# Patient Record
Sex: MALE | Race: BLACK OR AFRICAN AMERICAN | NOT HISPANIC OR LATINO | Employment: OTHER | URBAN - METROPOLITAN AREA
[De-identification: names, ages, dates, MRNs, and addresses within clinical notes are randomized per-mention and may not be internally consistent; named-entity substitution may affect disease eponyms.]

---

## 2023-03-24 LAB — PROSTATE SPECIFIC AG (NG/ML) IN SER/PLAS: 0.28 NG/ML (ref 0–4)

## 2023-03-26 LAB — URINE CULTURE: NO GROWTH

## 2023-03-29 LAB
APPEARANCE, URINE: ABNORMAL
BILIRUBIN, URINE: NEGATIVE
BLOOD, URINE: NEGATIVE
COLOR, URINE: ABNORMAL
GLUCOSE, URINE: NEGATIVE MG/DL
KETONES, URINE: NEGATIVE MG/DL
LEUKOCYTE ESTERASE, URINE: NEGATIVE
NITRITE, URINE: NEGATIVE
PH, URINE: 5 (ref 5–8)
PROTEIN, URINE: NEGATIVE MG/DL
SPECIFIC GRAVITY, URINE: 1.03 (ref 1–1.03)
UROBILINOGEN, URINE: 2 MG/DL (ref 0–1.9)

## 2023-05-08 DIAGNOSIS — E78.5 HYPERLIPIDEMIA, UNSPECIFIED HYPERLIPIDEMIA TYPE: Primary | ICD-10-CM

## 2023-05-09 RX ORDER — ATORVASTATIN CALCIUM 40 MG/1
40 TABLET, FILM COATED ORAL DAILY
Qty: 30 TABLET | Refills: 0 | Status: SHIPPED | OUTPATIENT
Start: 2023-05-09 | End: 2023-05-25 | Stop reason: SDUPTHER

## 2023-05-25 ENCOUNTER — OFFICE VISIT (OUTPATIENT)
Dept: PRIMARY CARE | Facility: CLINIC | Age: 63
End: 2023-05-25
Payer: COMMERCIAL

## 2023-05-25 VITALS
HEART RATE: 98 BPM | DIASTOLIC BLOOD PRESSURE: 82 MMHG | SYSTOLIC BLOOD PRESSURE: 120 MMHG | WEIGHT: 170.2 LBS | HEIGHT: 68 IN | OXYGEN SATURATION: 98 % | BODY MASS INDEX: 25.79 KG/M2

## 2023-05-25 DIAGNOSIS — G80.8 OTHER CEREBRAL PALSY (MULTI): ICD-10-CM

## 2023-05-25 DIAGNOSIS — Z00.00 MEDICARE ANNUAL WELLNESS VISIT, SUBSEQUENT: Primary | Chronic | ICD-10-CM

## 2023-05-25 DIAGNOSIS — R05.3 CHRONIC COUGH: ICD-10-CM

## 2023-05-25 DIAGNOSIS — E78.5 HYPERLIPIDEMIA, UNSPECIFIED HYPERLIPIDEMIA TYPE: ICD-10-CM

## 2023-05-25 DIAGNOSIS — Z85.46 PERSONAL HISTORY OF PROSTATE CANCER: ICD-10-CM

## 2023-05-25 DIAGNOSIS — F90.2 ATTENTION DEFICIT HYPERACTIVITY DISORDER (ADHD), COMBINED TYPE: ICD-10-CM

## 2023-05-25 DIAGNOSIS — E78.49 OTHER HYPERLIPIDEMIA: ICD-10-CM

## 2023-05-25 DIAGNOSIS — J30.9 ALLERGIC RHINITIS, UNSPECIFIED SEASONALITY, UNSPECIFIED TRIGGER: ICD-10-CM

## 2023-05-25 DIAGNOSIS — D64.9 ANEMIA, UNSPECIFIED TYPE: ICD-10-CM

## 2023-05-25 DIAGNOSIS — R09.82 POST-NASAL DRIP: ICD-10-CM

## 2023-05-25 DIAGNOSIS — F81.9 LEARNING DISABILITIES: ICD-10-CM

## 2023-05-25 LAB
BASOPHILS (10*3/UL) IN BLOOD BY AUTOMATED COUNT: 0.03 X10E9/L (ref 0–0.1)
BASOPHILS/100 LEUKOCYTES IN BLOOD BY AUTOMATED COUNT: 0.6 % (ref 0–2)
EOSINOPHILS (10*3/UL) IN BLOOD BY AUTOMATED COUNT: 0.1 X10E9/L (ref 0–0.7)
EOSINOPHILS/100 LEUKOCYTES IN BLOOD BY AUTOMATED COUNT: 2.1 % (ref 0–6)
ERYTHROCYTE DISTRIBUTION WIDTH (RATIO) BY AUTOMATED COUNT: 15 % (ref 11.5–14.5)
ERYTHROCYTE MEAN CORPUSCULAR HEMOGLOBIN CONCENTRATION (G/DL) BY AUTOMATED: 30.1 G/DL (ref 32–36)
ERYTHROCYTE MEAN CORPUSCULAR VOLUME (FL) BY AUTOMATED COUNT: 99 FL (ref 80–100)
ERYTHROCYTES (10*6/UL) IN BLOOD BY AUTOMATED COUNT: 4.43 X10E12/L (ref 4.5–5.9)
HEMATOCRIT (%) IN BLOOD BY AUTOMATED COUNT: 43.9 % (ref 41–52)
HEMOGLOBIN (G/DL) IN BLOOD: 13.2 G/DL (ref 13.5–17.5)
IMMATURE GRANULOCYTES/100 LEUKOCYTES IN BLOOD BY AUTOMATED COUNT: 0 % (ref 0–0.9)
LEUKOCYTES (10*3/UL) IN BLOOD BY AUTOMATED COUNT: 4.8 X10E9/L (ref 4.4–11.3)
LYMPHOCYTES (10*3/UL) IN BLOOD BY AUTOMATED COUNT: 1.71 X10E9/L (ref 1.2–4.8)
LYMPHOCYTES/100 LEUKOCYTES IN BLOOD BY AUTOMATED COUNT: 35.4 % (ref 13–44)
MONOCYTES (10*3/UL) IN BLOOD BY AUTOMATED COUNT: 0.52 X10E9/L (ref 0.1–1)
MONOCYTES/100 LEUKOCYTES IN BLOOD BY AUTOMATED COUNT: 10.8 % (ref 2–10)
NEUTROPHILS (10*3/UL) IN BLOOD BY AUTOMATED COUNT: 2.47 X10E9/L (ref 1.2–7.7)
NEUTROPHILS/100 LEUKOCYTES IN BLOOD BY AUTOMATED COUNT: 51.1 % (ref 40–80)
NRBC (PER 100 WBCS) BY AUTOMATED COUNT: 0 /100 WBC (ref 0–0)
PLATELETS (10*3/UL) IN BLOOD AUTOMATED COUNT: 233 X10E9/L (ref 150–450)

## 2023-05-25 PROCEDURE — 80053 COMPREHEN METABOLIC PANEL: CPT

## 2023-05-25 PROCEDURE — 1036F TOBACCO NON-USER: CPT | Performed by: INTERNAL MEDICINE

## 2023-05-25 PROCEDURE — G0439 PPPS, SUBSEQ VISIT: HCPCS | Performed by: INTERNAL MEDICINE

## 2023-05-25 PROCEDURE — 93000 ELECTROCARDIOGRAM COMPLETE: CPT | Performed by: INTERNAL MEDICINE

## 2023-05-25 PROCEDURE — 85025 COMPLETE CBC W/AUTO DIFF WBC: CPT

## 2023-05-25 PROCEDURE — 36415 COLL VENOUS BLD VENIPUNCTURE: CPT

## 2023-05-25 PROCEDURE — 80061 LIPID PANEL: CPT

## 2023-05-25 RX ORDER — ASPIRIN 81 MG/1
81 TABLET ORAL ONCE
COMMUNITY
Start: 2017-10-14

## 2023-05-25 RX ORDER — IMIPRAMINE HYDROCHLORIDE 10 MG/1
10 TABLET, FILM COATED ORAL 2 TIMES DAILY
COMMUNITY
Start: 2013-03-04

## 2023-05-25 RX ORDER — BUSPIRONE HYDROCHLORIDE 7.5 MG/1
7.5 TABLET ORAL 3 TIMES DAILY
COMMUNITY
Start: 2023-04-24

## 2023-05-25 RX ORDER — PSYLLIUM HUSK 3.4 G/12G
POWDER ORAL
COMMUNITY
Start: 2002-02-07

## 2023-05-25 RX ORDER — DOCUSATE SODIUM 100 MG/1
100 CAPSULE, LIQUID FILLED ORAL 2 TIMES DAILY PRN
COMMUNITY
Start: 2001-08-07

## 2023-05-25 RX ORDER — FLUTICASONE PROPIONATE 50 MCG
1 SPRAY, SUSPENSION (ML) NASAL DAILY
Qty: 16 G | Refills: 5 | Status: SHIPPED | OUTPATIENT
Start: 2023-05-25 | End: 2023-10-04 | Stop reason: ALTCHOICE

## 2023-05-25 RX ORDER — ATORVASTATIN CALCIUM 40 MG/1
40 TABLET, FILM COATED ORAL DAILY
Qty: 30 TABLET | Refills: 0 | Status: SHIPPED | OUTPATIENT
Start: 2023-05-25 | End: 2023-06-09 | Stop reason: SDUPTHER

## 2023-05-25 RX ORDER — MONTELUKAST SODIUM 10 MG/1
10 TABLET ORAL NIGHTLY
Qty: 30 TABLET | Refills: 5 | Status: SHIPPED | OUTPATIENT
Start: 2023-05-25 | End: 2023-10-04 | Stop reason: ALTCHOICE

## 2023-05-25 ASSESSMENT — ENCOUNTER SYMPTOMS
SHORTNESS OF BREATH: 0
CHILLS: 0
WHEEZING: 0
SORE THROAT: 0
COUGH: 1
FEVER: 0

## 2023-05-25 ASSESSMENT — ACTIVITIES OF DAILY LIVING (ADL)
DOING_HOUSEWORK: INDEPENDENT
TAKING_MEDICATION: INDEPENDENT
GROCERY_SHOPPING: INDEPENDENT
DRESSING: INDEPENDENT
MANAGING_FINANCES: INDEPENDENT
BATHING: INDEPENDENT

## 2023-05-25 ASSESSMENT — PATIENT HEALTH QUESTIONNAIRE - PHQ9
SUM OF ALL RESPONSES TO PHQ9 QUESTIONS 1 AND 2: 0
1. LITTLE INTEREST OR PLEASURE IN DOING THINGS: NOT AT ALL
2. FEELING DOWN, DEPRESSED OR HOPELESS: NOT AT ALL

## 2023-05-25 ASSESSMENT — COPD QUESTIONNAIRES: COPD: 0

## 2023-05-25 NOTE — PROGRESS NOTES
"Subjective   Reason for Visit: Shiva Alexander is an 62 y.o. male here for a Medicare Wellness visit.     Past Medical, Surgical, and Family History reviewed and updated in chart.    Reviewed all medications by prescribing practitioner or clinical pharmacist (such as prescriptions, OTCs, herbal therapies and supplements) and documented in the medical record.    Cough  This is a chronic problem. The current episode started 1 to 4 weeks ago. The problem has been resolved. The problem occurs constantly. The cough is Non-productive. Associated symptoms include nasal congestion and postnasal drip. Pertinent negatives include no chest pain, chills, ear congestion, ear pain, fever, sore throat, shortness of breath or wheezing. Nothing aggravates the symptoms. He has tried prescription cough suppressant for the symptoms. The treatment provided mild relief. There is no history of asthma, bronchitis, COPD, emphysema, environmental allergies or pneumonia.       Patient Self Assessment of Health Status  Patient Self Assessment: Good    Nutrition and Exercise  Current Diet: Well Balanced Diet  Adequate Fluid Intake: Yes  Caffeine: No  Exercise Frequency: Infrequently    Functional Ability/Level of Safety  Cognitive Impairment Observed: No cognitive impairment observed  Cognitive Impairment Reported: No cognitive impairment reported by patient or family    Home Safety Risk Factors: None    Patient Care Team:  Lul Arteaga MD as PCP - General  Lul Arteaga MD as PCP - [place] [employer] PCP     Review of Systems   Constitutional:  Negative for chills and fever.   HENT:  Positive for postnasal drip. Negative for ear pain and sore throat.    Respiratory:  Positive for cough. Negative for shortness of breath and wheezing.    Cardiovascular:  Negative for chest pain.   Allergic/Immunologic: Negative for environmental allergies.       Objective   Vitals:  /82   Pulse 98   Ht 1.727 m (5' 8\")   Wt 77.2 kg (170 lb 3.2 " oz)   SpO2 98%   BMI 25.88 kg/m²       Physical Exam    Lab Results   Component Value Date    WBC 4.9 06/08/2021    HGB 14.2 06/08/2021    HCT 44.9 06/08/2021     06/08/2021    CHOL 131 07/14/2022    TRIG 44 07/14/2022    HDL 71.5 07/14/2022    ALT 27 06/08/2021    AST 24 06/08/2021     06/08/2021    K 4.4 06/08/2021     06/08/2021    CREATININE 1.14 06/08/2021    BUN 17 06/08/2021    CO2 33 (H) 06/08/2021    TSH 1.74 05/06/2021    PSA 0.28 03/24/2023       Assessment/Plan   Problem List Items Addressed This Visit          Nervous    Other cerebral palsy (CMS/HCC)       Respiratory    Chronic cough       Other    Post-nasal drip    Relevant Medications    montelukast (Singulair) 10 mg tablet    fluticasone (Flonase) 50 mcg/actuation nasal spray    Personal history of prostate cancer    Attention deficit hyperactivity disorder (ADHD), combined type    Learning disabilities    Other hyperlipidemia    Relevant Orders    Lipid panel     Other Visit Diagnoses       Medicare annual wellness visit, subsequent  (Chronic)   -  Primary    Relevant Orders    Comprehensive metabolic panel    Anemia, unspecified type        Relevant Orders    CBC and Auto Differential    Hyperlipidemia, unspecified hyperlipidemia type        Relevant Medications    atorvastatin (Lipitor) 40 mg tablet    Allergic rhinitis, unspecified seasonality, unspecified trigger        Relevant Medications    montelukast (Singulair) 10 mg tablet    fluticasone (Flonase) 50 mcg/actuation nasal spray             A/P       PT HAS POST NASAL DRIP  FROM CHRONIC ALLERGIC RHINITIS   WILL START HIM ON MONTELUKAST 10MG 1 PILL EVERY DAY FILLED   FLONASE NASAL SPRAY USE  DIRECTED FILLED   NEED TO DO WARM SALT WATER GURGLE   CBC,CMP,LIPID  ATORVASTATIN  40MG 1 PILL EVERY DAY FILLED           Advance Care Planning Note     Discussion Date: 05/25/23   Discussion Participants: patient    The patient wishes to discuss Advance Care Planning today and the  following is a brief summary of our discussion.     Patient has capacity to make their own medical decisions: Yes  Health Care Agent/Surrogate Decision Maker documented in chart: No    Documents on file and valid:  Advance Directive/Living Will: No   Health Care Power of : No  Other: NA    Communication of Medical Status/Prognosis:   NA     Communication of Treatment Goals/Options:       DISCUSSED WITH THE PT END OF LIFE CHOICES , PT IS FULL CODE   , PT WILL THINK ABOUT GETTING THE LIVING WILL OR HEALTH CARE POA , AND WHEN READY WILL BRING IT ON NEXT OFFICE VISIT , SO THIS CAN BE SCANNED INTO THE CHART FOR DOCUMENTATION PURPOSE , PT UNDERSTOOD      Treatment Decisions  DISCUSSED     Time Statement: Total face to face time spent on advance care planning was 5 minutes with 5 minutes spent in counseling, including the explanation.    Lul Arteaga MD  5/25/2023 11:23 AM

## 2023-05-26 LAB
ALANINE AMINOTRANSFERASE (SGPT) (U/L) IN SER/PLAS: 24 U/L (ref 10–52)
ALBUMIN (G/DL) IN SER/PLAS: 4 G/DL (ref 3.4–5)
ALKALINE PHOSPHATASE (U/L) IN SER/PLAS: 61 U/L (ref 33–136)
ANION GAP IN SER/PLAS: 12 MMOL/L (ref 10–20)
ASPARTATE AMINOTRANSFERASE (SGOT) (U/L) IN SER/PLAS: 25 U/L (ref 9–39)
BILIRUBIN TOTAL (MG/DL) IN SER/PLAS: 1.2 MG/DL (ref 0–1.2)
CALCIUM (MG/DL) IN SER/PLAS: 9.5 MG/DL (ref 8.6–10.6)
CARBON DIOXIDE, TOTAL (MMOL/L) IN SER/PLAS: 30 MMOL/L (ref 21–32)
CHLORIDE (MMOL/L) IN SER/PLAS: 103 MMOL/L (ref 98–107)
CHOLESTEROL (MG/DL) IN SER/PLAS: 127 MG/DL (ref 0–199)
CHOLESTEROL IN HDL (MG/DL) IN SER/PLAS: 64.2 MG/DL
CHOLESTEROL/HDL RATIO: 2
CREATININE (MG/DL) IN SER/PLAS: 1.07 MG/DL (ref 0.5–1.3)
GFR MALE: 77 ML/MIN/1.73M2
GLUCOSE (MG/DL) IN SER/PLAS: 91 MG/DL (ref 74–99)
LDL: 55 MG/DL (ref 0–99)
POTASSIUM (MMOL/L) IN SER/PLAS: 4.3 MMOL/L (ref 3.5–5.3)
PROTEIN TOTAL: 7.7 G/DL (ref 6.4–8.2)
SODIUM (MMOL/L) IN SER/PLAS: 141 MMOL/L (ref 136–145)
TRIGLYCERIDE (MG/DL) IN SER/PLAS: 37 MG/DL (ref 0–149)
UREA NITROGEN (MG/DL) IN SER/PLAS: 18 MG/DL (ref 6–23)
VLDL: 7 MG/DL (ref 0–40)

## 2023-05-31 ENCOUNTER — TELEPHONE (OUTPATIENT)
Dept: PRIMARY CARE | Facility: CLINIC | Age: 63
End: 2023-05-31
Payer: MEDICARE

## 2023-05-31 NOTE — TELEPHONE ENCOUNTER
----- Message from Lul Arteaga MD sent at 5/31/2023 11:01 AM EDT -----  Lab looks good except he is slightly anemic I would like him to do some additional blood test and Helicobacter pylori breath test lab request

## 2023-06-02 DIAGNOSIS — D64.9 ANEMIA, UNSPECIFIED TYPE: Primary | ICD-10-CM

## 2023-06-09 DIAGNOSIS — E78.5 HYPERLIPIDEMIA, UNSPECIFIED HYPERLIPIDEMIA TYPE: ICD-10-CM

## 2023-06-09 RX ORDER — ATORVASTATIN CALCIUM 40 MG/1
40 TABLET, FILM COATED ORAL DAILY
Qty: 90 TABLET | Refills: 3 | Status: SHIPPED | OUTPATIENT
Start: 2023-06-09 | End: 2023-12-04 | Stop reason: SDUPTHER

## 2023-06-19 ENCOUNTER — TELEPHONE (OUTPATIENT)
Dept: PRIMARY CARE | Facility: CLINIC | Age: 63
End: 2023-06-19
Payer: MEDICARE

## 2023-06-19 NOTE — TELEPHONE ENCOUNTER
Pt. Requesting most recent lab results. These were done at [facility]. Not sure if you can access these. I have called for them to fax the results.  Pt. Would like results mailed to his home.

## 2023-07-12 NOTE — TELEPHONE ENCOUNTER
Doctor has communicated through [employer].

## 2023-07-17 ENCOUNTER — TELEPHONE (OUTPATIENT)
Dept: PRIMARY CARE | Facility: CLINIC | Age: 63
End: 2023-07-17
Payer: MEDICARE

## 2023-07-17 NOTE — TELEPHONE ENCOUNTER
Pt.'s sister states pt. Has not received his lab results. Can you please advise. Labs have been scanned into his chart.

## 2023-07-24 ENCOUNTER — APPOINTMENT (OUTPATIENT)
Dept: PRIMARY CARE | Facility: CLINIC | Age: 63
End: 2023-07-24
Payer: MEDICARE

## 2023-08-07 ENCOUNTER — TELEPHONE (OUTPATIENT)
Dept: PRIMARY CARE | Facility: CLINIC | Age: 63
End: 2023-08-07
Payer: MEDICARE

## 2023-09-25 LAB — PROSTATE SPECIFIC AG (NG/ML) IN SER/PLAS: 0.47 NG/ML (ref 0–4)

## 2023-10-03 PROBLEM — K44.9 DIAPHRAGMATIC HERNIA: Status: ACTIVE | Noted: 2023-10-03

## 2023-10-03 PROBLEM — Z92.3 HISTORY OF RADIATION THERAPY: Status: ACTIVE | Noted: 2023-10-03

## 2023-10-03 PROBLEM — N52.9 MALE ERECTILE DISORDER OF ORGANIC ORIGIN: Status: ACTIVE | Noted: 2023-10-03

## 2023-10-03 PROBLEM — I63.9 CEREBRAL INFARCTION, UNSPECIFIED (MULTI): Status: ACTIVE | Noted: 2023-10-03

## 2023-10-03 PROBLEM — F81.9 PROBLEMS WITH LEARNING: Status: ACTIVE | Noted: 2023-10-03

## 2023-10-03 PROBLEM — K40.90 INGUINAL HERNIA: Status: ACTIVE | Noted: 2023-10-03

## 2023-10-03 PROBLEM — D12.6 TUBULAR ADENOMA OF COLON: Status: ACTIVE | Noted: 2023-10-03

## 2023-10-03 PROBLEM — R73.9 HYPERGLYCEMIA: Status: ACTIVE | Noted: 2023-10-03

## 2023-10-03 PROBLEM — R41.89 COGNITIVE CHANGE: Status: ACTIVE | Noted: 2023-10-03

## 2023-10-03 PROBLEM — I63.211: Status: ACTIVE | Noted: 2017-10-13

## 2023-10-03 PROBLEM — F41.1 ANXIETY STATE: Status: ACTIVE | Noted: 2023-10-03

## 2023-10-03 PROBLEM — L98.9 SKIN LESION OF RIGHT ARM: Status: ACTIVE | Noted: 2023-10-03

## 2023-10-03 PROBLEM — E55.9 VITAMIN D DEFICIENCY: Status: ACTIVE | Noted: 2023-10-03

## 2023-10-03 PROBLEM — R31.9 HEMATURIA: Status: ACTIVE | Noted: 2023-10-03

## 2023-10-03 PROBLEM — H52.4 PRESBYOPIA: Status: ACTIVE | Noted: 2023-10-03

## 2023-10-03 PROBLEM — N40.0 BENIGN ENLARGEMENT OF PROSTATE: Status: ACTIVE | Noted: 2023-10-03

## 2023-10-03 RX ORDER — CICLOPIROX 80 MG/ML
SOLUTION TOPICAL
COMMUNITY

## 2023-10-03 RX ORDER — ERGOCALCIFEROL 1.25 MG/1
1.25 CAPSULE ORAL
COMMUNITY

## 2023-10-04 ENCOUNTER — APPOINTMENT (OUTPATIENT)
Dept: UROLOGY | Facility: HOSPITAL | Age: 63
End: 2023-10-04
Payer: MEDICARE

## 2023-10-04 ENCOUNTER — OFFICE VISIT (OUTPATIENT)
Dept: UROLOGY | Facility: HOSPITAL | Age: 63
End: 2023-10-04
Payer: MEDICARE

## 2023-10-04 DIAGNOSIS — Z12.5 SCREENING FOR PROSTATE CANCER: ICD-10-CM

## 2023-10-04 PROCEDURE — 99203 OFFICE O/P NEW LOW 30 MIN: CPT | Performed by: STUDENT IN AN ORGANIZED HEALTH CARE EDUCATION/TRAINING PROGRAM

## 2023-10-04 PROCEDURE — 99213 OFFICE O/P EST LOW 20 MIN: CPT | Performed by: STUDENT IN AN ORGANIZED HEALTH CARE EDUCATION/TRAINING PROGRAM

## 2023-10-04 PROCEDURE — 1159F MED LIST DOCD IN RCRD: CPT | Performed by: STUDENT IN AN ORGANIZED HEALTH CARE EDUCATION/TRAINING PROGRAM

## 2023-10-04 PROCEDURE — 1126F AMNT PAIN NOTED NONE PRSNT: CPT | Performed by: STUDENT IN AN ORGANIZED HEALTH CARE EDUCATION/TRAINING PROGRAM

## 2023-10-04 PROCEDURE — 1036F TOBACCO NON-USER: CPT | Performed by: STUDENT IN AN ORGANIZED HEALTH CARE EDUCATION/TRAINING PROGRAM

## 2023-10-04 PROCEDURE — 1160F RVW MEDS BY RX/DR IN RCRD: CPT | Performed by: STUDENT IN AN ORGANIZED HEALTH CARE EDUCATION/TRAINING PROGRAM

## 2023-10-04 NOTE — PROGRESS NOTES
Subjective   Patient ID: Shiva Alexander is a 63 y.o. male who presents for No chief complaint on file..  HPI  The patient presents for a 6 month follow up for history of prostate cancer. He denies burning, pelvic pain or any other urinary symptoms. He denies urinary retention. He has undergone radiation and gets regular PSA evaluations.     Review of Systems  All systems were reviewed. Anything negative was noted in the HPI.    Objective   Physical Exam  HENT:      Right Ear: External ear normal.      Mouth/Throat:      Pharynx: Oropharynx is clear.   Abdominal:      General: Abdomen is flat. Bowel sounds are normal.      Palpations: Abdomen is soft.       Visit Vitals  Smoking Status Never       Assessment/Plan   There are no diagnoses linked to this encounter.    - History of prostate cancer: His last PSA was 0.47. He has undergone radiation. We had a very long and extensive discussion with the patient regarding the pathophysiology, differential diagnosis, risk factor, management, natural history, incidence and diagnostic work-up of the condition.    Plan:  Repeat PSA evaluation in 6 months    Follow up: 6 months    Scribed for Dr. Ankit Arana by Lenore Redmond medical scribe, on 10/04/23 at 10:00 AM.

## 2023-10-16 ENCOUNTER — APPOINTMENT (OUTPATIENT)
Dept: UROLOGY | Facility: HOSPITAL | Age: 63
End: 2023-10-16
Payer: COMMERCIAL

## 2023-12-04 DIAGNOSIS — E78.5 HYPERLIPIDEMIA, UNSPECIFIED HYPERLIPIDEMIA TYPE: ICD-10-CM

## 2023-12-04 RX ORDER — ATORVASTATIN CALCIUM 40 MG/1
40 TABLET, FILM COATED ORAL DAILY
Qty: 90 TABLET | Refills: 1 | Status: SHIPPED | OUTPATIENT
Start: 2023-12-04

## 2023-12-09 ENCOUNTER — APPOINTMENT (OUTPATIENT)
Dept: OPHTHALMOLOGY | Facility: CLINIC | Age: 63
End: 2023-12-09
Payer: MEDICARE

## 2023-12-16 ENCOUNTER — OFFICE VISIT (OUTPATIENT)
Dept: OPHTHALMOLOGY | Facility: CLINIC | Age: 63
End: 2023-12-16
Payer: MEDICARE

## 2023-12-16 DIAGNOSIS — H52.4 PRESBYOPIA: ICD-10-CM

## 2023-12-16 DIAGNOSIS — H43.812 PVD (POSTERIOR VITREOUS DETACHMENT), LEFT EYE: Primary | ICD-10-CM

## 2023-12-16 PROCEDURE — 92014 COMPRE OPH EXAM EST PT 1/>: CPT | Performed by: STUDENT IN AN ORGANIZED HEALTH CARE EDUCATION/TRAINING PROGRAM

## 2023-12-16 ASSESSMENT — CONF VISUAL FIELD
OD_INFERIOR_NASAL_RESTRICTION: 0
OS_NORMAL: 1
OD_INFERIOR_TEMPORAL_RESTRICTION: 0
OD_SUPERIOR_NASAL_RESTRICTION: 0
OS_SUPERIOR_TEMPORAL_RESTRICTION: 0
METHOD: COUNTING FINGERS
OD_SUPERIOR_TEMPORAL_RESTRICTION: 0
OS_INFERIOR_NASAL_RESTRICTION: 0
OD_NORMAL: 1
OS_INFERIOR_TEMPORAL_RESTRICTION: 0
OS_SUPERIOR_NASAL_RESTRICTION: 0

## 2023-12-16 ASSESSMENT — EXTERNAL EXAM - RIGHT EYE: OD_EXAM: NORMAL

## 2023-12-16 ASSESSMENT — REFRACTION_WEARINGRX: SPECS_TYPE: OTC

## 2023-12-16 ASSESSMENT — ENCOUNTER SYMPTOMS
ENDOCRINE NEGATIVE: 0
GASTROINTESTINAL NEGATIVE: 0
PSYCHIATRIC NEGATIVE: 0
CARDIOVASCULAR NEGATIVE: 0
ALLERGIC/IMMUNOLOGIC NEGATIVE: 0
EYES NEGATIVE: 1
CONSTITUTIONAL NEGATIVE: 0
RESPIRATORY NEGATIVE: 0
MUSCULOSKELETAL NEGATIVE: 0
NEUROLOGICAL NEGATIVE: 0
HEMATOLOGIC/LYMPHATIC NEGATIVE: 0

## 2023-12-16 ASSESSMENT — SLIT LAMP EXAM - LIDS
COMMENTS: GOOD POSITION
COMMENTS: GOOD POSITION

## 2023-12-16 ASSESSMENT — TONOMETRY
OS_IOP_MMHG: 15
OD_IOP_MMHG: 15
IOP_METHOD: GOLDMANN APPLANATION

## 2023-12-16 ASSESSMENT — VISUAL ACUITY
METHOD: SNELLEN - LINEAR
OD_SC: 20/25
OS_SC: 20/20

## 2023-12-16 ASSESSMENT — REFRACTION_MANIFEST
OD_CYLINDER: -0.50
OS_ADD: +2.50
OD_AXIS: 015
OS_SPHERE: +0.50
OS_AXIS: 170
OD_ADD: +2.50
OD_SPHERE: +0.50
OS_CYLINDER: -0.50

## 2023-12-16 ASSESSMENT — CUP TO DISC RATIO
OD_RATIO: .3
OS_RATIO: .3

## 2023-12-16 ASSESSMENT — EXTERNAL EXAM - LEFT EYE: OS_EXAM: NORMAL

## 2023-12-16 NOTE — PROGRESS NOTES
Assessment/Plan   Diagnoses and all orders for this visit:  PVD (posterior vitreous detachment), left eye  -stable healthy retinal exam  -new finding of PVD OS  -Discussed signs and symtpoms of retinal hole, tear, detachment. Patient educated that retinal detachment can lead to permanent vision loss. Patient consents to return if they notice new floaters, flashes, curtain or veil covering vision.  Presbyopia  -patient prefers near vision (NV) OTC specs  -BCVA 20/20 OD+OS    RTC 1 year for annual with MO and JOSETTE

## 2024-03-25 ENCOUNTER — LAB (OUTPATIENT)
Dept: LAB | Facility: LAB | Age: 64
End: 2024-03-25
Payer: MEDICARE

## 2024-03-25 DIAGNOSIS — Z12.5 SCREENING FOR PROSTATE CANCER: ICD-10-CM

## 2024-03-25 LAB — PSA SERPL-MCNC: 0.28 NG/ML

## 2024-03-25 PROCEDURE — 36415 COLL VENOUS BLD VENIPUNCTURE: CPT

## 2024-03-25 PROCEDURE — G0103 PSA SCREENING: HCPCS

## 2024-04-04 ENCOUNTER — OFFICE VISIT (OUTPATIENT)
Dept: UROLOGY | Facility: HOSPITAL | Age: 64
End: 2024-04-04
Payer: MEDICARE

## 2024-04-04 DIAGNOSIS — C61 PROSTATE CANCER (MULTI): Primary | ICD-10-CM

## 2024-04-04 PROCEDURE — 99214 OFFICE O/P EST MOD 30 MIN: CPT | Performed by: STUDENT IN AN ORGANIZED HEALTH CARE EDUCATION/TRAINING PROGRAM

## 2024-04-04 PROCEDURE — 1036F TOBACCO NON-USER: CPT | Performed by: STUDENT IN AN ORGANIZED HEALTH CARE EDUCATION/TRAINING PROGRAM

## 2024-04-04 PROCEDURE — 1159F MED LIST DOCD IN RCRD: CPT | Performed by: STUDENT IN AN ORGANIZED HEALTH CARE EDUCATION/TRAINING PROGRAM

## 2024-04-04 NOTE — PROGRESS NOTES
Subjective   Patient ID: Shiva Alexander is a 63 y.o. male    HPI  63 y.o. male with a history of prostate cancer, presented for a routine follow-up visit. He reports that his urinary function is normal, with no difficulty in urination. He denies taking any medications such as Flomax (Tamsulosin) and does not recall being prescribed any. He remembers undergoing a cystoscopic examination six months ago. His PSA levels, which had previously shown a slight increase, have now decreased to 0.28, which is his baseline. He reports no issues with his ears, no presence of blood in the urine, no burning, or pain during urination. Overall, he feels well and has no new complaints.    The most recent PSA, conducted on 3/25/2024, revealed 0.28 ng/ml     Review of Systems    All systems were reviewed. Anything negative was noted in the HPI.    Objective   Physical Exam    General: Well developed, well nourished, alert and cooperative, appears in no acute distress   Eyes: Non-injected conjunctiva, sclera clear, no proptosis   Cardiac: Extremities are warm and well perfused. No edema, cyanosis or pallor   Lungs: Breathing is easy, non-labored. Speaking in clear and complete sentences. Normal diaphragmatic movement   MSK: Ambulatory with steady gait, unassisted   Neuro: Alert and oriented to person, place, and time   Psych: Demonstrates good judgment and reason, without hallucinations, abnormal affect or abnormal behaviors   Skin: No obvious lesions, no rashes       No CVA tenderness bilaterally   No suprapubic pain or discomfort       Past Medical History:   Diagnosis Date    Abnormal weight loss 10/02/2018    Unintentional weight loss    Impacted cerumen, right ear 05/30/2019    Impacted cerumen of right ear    Male erectile dysfunction, unspecified 08/31/2021    Male erectile disorder of organic origin    Personal history of other diseases of the digestive system 08/08/2017    History of anal fissures    Personal history of other  diseases of the nervous system and sense organs     History of cerebral palsy    Unspecified injury of ear, initial encounter 05/30/2019    Trauma of ear canal         Past Surgical History:   Procedure Laterality Date    HERNIA REPAIR  09/02/2014    Hernia Repair           Assessment/Plan   History of prostate cancer with stable PSA levels    63 y.o. male who presents for the above condition, We had a very long and extensive discussion with the patient regarding the pathophysiology, differential diagnosis, risk factor, management, natural history, incidence and diagnostic work-up of the condition.      Plan:  - Follow up in 6 months with PSA          Scribe Attestation  By signing my name below, IWalt Scribe   attest that this documentation has been prepared under the direction and in the presence of Dr. Ankit Arana

## 2024-05-02 ENCOUNTER — APPOINTMENT (OUTPATIENT)
Dept: PRIMARY CARE | Facility: CLINIC | Age: 64
End: 2024-05-02
Payer: MEDICARE

## 2024-05-09 ENCOUNTER — OFFICE VISIT (OUTPATIENT)
Dept: PRIMARY CARE | Facility: CLINIC | Age: 64
End: 2024-05-09
Payer: MEDICARE

## 2024-05-09 ENCOUNTER — LAB (OUTPATIENT)
Dept: LAB | Facility: LAB | Age: 64
End: 2024-05-09
Payer: MEDICARE

## 2024-05-09 VITALS — BODY MASS INDEX: 25.92 KG/M2 | DIASTOLIC BLOOD PRESSURE: 74 MMHG | WEIGHT: 170.5 LBS | SYSTOLIC BLOOD PRESSURE: 124 MMHG

## 2024-05-09 DIAGNOSIS — G80.8 OTHER CEREBRAL PALSY (MULTI): ICD-10-CM

## 2024-05-09 DIAGNOSIS — F41.1 ANXIETY STATE: ICD-10-CM

## 2024-05-09 DIAGNOSIS — K21.9 GASTROESOPHAGEAL REFLUX DISEASE WITHOUT ESOPHAGITIS: ICD-10-CM

## 2024-05-09 DIAGNOSIS — D64.9 ANEMIA, UNSPECIFIED TYPE: ICD-10-CM

## 2024-05-09 DIAGNOSIS — Z00.00 HEALTH CARE MAINTENANCE: Primary | ICD-10-CM

## 2024-05-09 DIAGNOSIS — E78.49 OTHER HYPERLIPIDEMIA: ICD-10-CM

## 2024-05-09 DIAGNOSIS — F90.2 ATTENTION DEFICIT HYPERACTIVITY DISORDER (ADHD), COMBINED TYPE: ICD-10-CM

## 2024-05-09 LAB
ALBUMIN SERPL BCP-MCNC: 4.1 G/DL (ref 3.4–5)
ALP SERPL-CCNC: 61 U/L (ref 33–136)
ALT SERPL W P-5'-P-CCNC: 37 U/L (ref 10–52)
ANION GAP SERPL CALC-SCNC: 11 MMOL/L (ref 10–20)
AST SERPL W P-5'-P-CCNC: 47 U/L (ref 9–39)
BILIRUB SERPL-MCNC: 1.2 MG/DL (ref 0–1.2)
BUN SERPL-MCNC: 15 MG/DL (ref 6–23)
CALCIUM SERPL-MCNC: 9.4 MG/DL (ref 8.6–10.6)
CHLORIDE SERPL-SCNC: 106 MMOL/L (ref 98–107)
CHOLEST SERPL-MCNC: 120 MG/DL (ref 0–199)
CHOLESTEROL/HDL RATIO: 1.9
CO2 SERPL-SCNC: 31 MMOL/L (ref 21–32)
CREAT SERPL-MCNC: 1.14 MG/DL (ref 0.5–1.3)
EGFRCR SERPLBLD CKD-EPI 2021: 71 ML/MIN/1.73M*2
ERYTHROCYTE [DISTWIDTH] IN BLOOD BY AUTOMATED COUNT: 14.6 % (ref 11.5–14.5)
GLUCOSE SERPL-MCNC: 70 MG/DL (ref 74–99)
HCT VFR BLD AUTO: 41.3 % (ref 41–52)
HDLC SERPL-MCNC: 61.6 MG/DL
HGB BLD-MCNC: 13.3 G/DL (ref 13.5–17.5)
LDLC SERPL CALC-MCNC: 50 MG/DL
MCH RBC QN AUTO: 30.4 PG (ref 26–34)
MCHC RBC AUTO-ENTMCNC: 32.2 G/DL (ref 32–36)
MCV RBC AUTO: 94 FL (ref 80–100)
NON HDL CHOLESTEROL: 58 MG/DL (ref 0–149)
NRBC BLD-RTO: 0 /100 WBCS (ref 0–0)
PLATELET # BLD AUTO: 207 X10*3/UL (ref 150–450)
POTASSIUM SERPL-SCNC: 4.5 MMOL/L (ref 3.5–5.3)
PROT SERPL-MCNC: 7.5 G/DL (ref 6.4–8.2)
RBC # BLD AUTO: 4.38 X10*6/UL (ref 4.5–5.9)
SODIUM SERPL-SCNC: 143 MMOL/L (ref 136–145)
TRIGL SERPL-MCNC: 41 MG/DL (ref 0–149)
VLDL: 8 MG/DL (ref 0–40)
WBC # BLD AUTO: 4.8 X10*3/UL (ref 4.4–11.3)

## 2024-05-09 PROCEDURE — 85027 COMPLETE CBC AUTOMATED: CPT

## 2024-05-09 PROCEDURE — G0439 PPPS, SUBSEQ VISIT: HCPCS | Performed by: INTERNAL MEDICINE

## 2024-05-09 PROCEDURE — 1160F RVW MEDS BY RX/DR IN RCRD: CPT | Performed by: INTERNAL MEDICINE

## 2024-05-09 PROCEDURE — 80053 COMPREHEN METABOLIC PANEL: CPT

## 2024-05-09 PROCEDURE — 1170F FXNL STATUS ASSESSED: CPT | Performed by: INTERNAL MEDICINE

## 2024-05-09 PROCEDURE — 93000 ELECTROCARDIOGRAM COMPLETE: CPT | Performed by: INTERNAL MEDICINE

## 2024-05-09 PROCEDURE — 36415 COLL VENOUS BLD VENIPUNCTURE: CPT

## 2024-05-09 PROCEDURE — 80061 LIPID PANEL: CPT

## 2024-05-09 PROCEDURE — 99214 OFFICE O/P EST MOD 30 MIN: CPT | Performed by: INTERNAL MEDICINE

## 2024-05-09 PROCEDURE — 1036F TOBACCO NON-USER: CPT | Performed by: INTERNAL MEDICINE

## 2024-05-09 PROCEDURE — 1159F MED LIST DOCD IN RCRD: CPT | Performed by: INTERNAL MEDICINE

## 2024-05-09 NOTE — PROGRESS NOTES
The patient for first time see updated front sheet CPE no chest pain no shortness of breath no side effect with medication Subjective   Patient ID: Shiva Alexander is a 63 y.o. male who presents for No chief complaint on file..    HPI   Has been doing okay no falls or other issues musculoskeletal bowels okay not much in the way of nocturia follows up with the urologist for his prostate cancer    Past medical history hyperlipidemia ADHD cerebral palsy GERD anxiety    Medications including aspirin atorvastatin BuSpar imipramine    Allergies noted and unchanged see EMR    Social history no tobacco no alcohol    Family history father prostate cancer hypertension mother hypertension ASCVD    Prevention walks for exercise some prior blood work reviewed discussed with prostate follow-up with urology and colon screening    Depression screen not depressed  Review of Systems    Objective   There were no vitals taken for this visit.    Physical Exam vital signs noted alert and oriented x 3 NCAT PERRLA EOMI nares without discharge OP benign TM normal bilateral EAC clear bilateral no AC nodes no JVD or bruit no thyromegaly chest clear to auscultation CV regular rate and rhythm S1-S2 without murmur gallop or rub abdomen soft nontender normal active bowel sounds no rebound or guarding no HSM LS spine normal curvature negative straight leg raise extremities no clubbing cyanosis or edema normal distal pulses DTR 2+    Assessment/Plan     Impression General medical examination cerebral palsy hyperlipidemia other diagnoses anemia? Anxiety ADHD GRD  Plan follow-up with psychiatry psychology as needed d/w medicine and side effects and issues check EKG advised on heart check Chem-7 advised on glucose potassium and kidney function check hepatic panel advised on liver enzymes check lipid panel advised on cholesterol profile check CBC advised on blood count nutrition ok for iron if needed avoid nsaids ok for ppi as needed follow-up on next  colonoscopy good diet regular exercise increase water consumption recheck based on labs TT 60 cc 31    Review chart other diagnoses follow-up on plan

## 2024-05-10 ASSESSMENT — ENCOUNTER SYMPTOMS
DEPRESSION: 0
OCCASIONAL FEELINGS OF UNSTEADINESS: 0
LOSS OF SENSATION IN FEET: 0

## 2024-05-10 ASSESSMENT — ACTIVITIES OF DAILY LIVING (ADL)
MANAGING_FINANCES: INDEPENDENT
DRESSING: INDEPENDENT
BATHING: INDEPENDENT
GROCERY_SHOPPING: INDEPENDENT
DOING_HOUSEWORK: INDEPENDENT
TAKING_MEDICATION: INDEPENDENT

## 2024-05-10 ASSESSMENT — PATIENT HEALTH QUESTIONNAIRE - PHQ9
1. LITTLE INTEREST OR PLEASURE IN DOING THINGS: NOT AT ALL
SUM OF ALL RESPONSES TO PHQ9 QUESTIONS 1 AND 2: 0
2. FEELING DOWN, DEPRESSED OR HOPELESS: NOT AT ALL

## 2024-06-13 ENCOUNTER — APPOINTMENT (OUTPATIENT)
Dept: PRIMARY CARE | Facility: CLINIC | Age: 64
End: 2024-06-13
Payer: MEDICARE

## 2024-06-13 VITALS — SYSTOLIC BLOOD PRESSURE: 127 MMHG | DIASTOLIC BLOOD PRESSURE: 67 MMHG

## 2024-06-13 DIAGNOSIS — L03.039 PARONYCHIA OF GREAT TOE: ICD-10-CM

## 2024-06-13 DIAGNOSIS — L60.3 NAIL DYSTROPHY: Primary | ICD-10-CM

## 2024-06-13 DIAGNOSIS — Z00.00 HEALTH CARE MAINTENANCE: ICD-10-CM

## 2024-06-13 PROCEDURE — 99213 OFFICE O/P EST LOW 20 MIN: CPT | Performed by: INTERNAL MEDICINE

## 2024-06-13 RX ORDER — CEPHALEXIN 500 MG/1
500 CAPSULE ORAL 2 TIMES DAILY
Qty: 12 CAPSULE | Refills: 0 | Status: SHIPPED | OUTPATIENT
Start: 2024-06-13 | End: 2024-06-19

## 2024-06-13 NOTE — PROGRESS NOTES
I reviewed the procedure Subjective   Patient ID: Shiva Alexander is a 63 y.o. male who presents for No chief complaint on file..    HPI sick visit no chest pain no shortness of breath no side effect with medication reviewed prior laboratory results had noticed some drainage from his left great toe area yellow in nature some irritation and discomfort there had not had any injury he has longstanding fungal nails finding increasingly difficult to care for them on his own    Review of Systems    Objective   There were no vitals taken for this visit.    Physical Exam vital signs noted alert and oriented x 3 NCAT no JVD chest clear to auscultation CV regular rate and rhythm S1-S2 extremities no clubbing cyanosis or edema normal distal pulses musculoskeletal skin the left foot was inspected the nails have all onychomycosis there is some paronychia changes at the base of the great toe with some yellow drainage some discoloration and some tenderness there    Assessment/Plan    impression nail dystrophy with paronychia other diagnoses  Plan prior bw acceptable okay to refer to podiatry referral made for general nail care and nutrition of the nails okay for Keflex 500 mg p.o. twice daily #12 refill 0 continue with foot soaks covering of the toe to protect against harm there continue with other care and read check based on above

## 2024-08-23 DIAGNOSIS — E78.5 HYPERLIPIDEMIA, UNSPECIFIED HYPERLIPIDEMIA TYPE: ICD-10-CM

## 2024-08-24 RX ORDER — ATORVASTATIN CALCIUM 40 MG/1
40 TABLET, FILM COATED ORAL DAILY
Qty: 90 TABLET | Refills: 1 | Status: SHIPPED | OUTPATIENT
Start: 2024-08-24

## 2024-08-26 DIAGNOSIS — E78.5 HYPERLIPIDEMIA, UNSPECIFIED HYPERLIPIDEMIA TYPE: ICD-10-CM

## 2024-08-28 RX ORDER — ATORVASTATIN CALCIUM 40 MG/1
40 TABLET, FILM COATED ORAL DAILY
Qty: 90 TABLET | Refills: 1 | Status: SHIPPED | OUTPATIENT
Start: 2024-08-28

## 2024-09-04 ENCOUNTER — APPOINTMENT (OUTPATIENT)
Dept: PRIMARY CARE | Facility: CLINIC | Age: 64
End: 2024-09-04
Payer: MEDICARE

## 2024-09-04 VITALS — DIASTOLIC BLOOD PRESSURE: 76 MMHG | SYSTOLIC BLOOD PRESSURE: 138 MMHG

## 2024-09-04 DIAGNOSIS — E78.49 OTHER HYPERLIPIDEMIA: ICD-10-CM

## 2024-09-04 DIAGNOSIS — I10 PRIMARY HYPERTENSION: Primary | ICD-10-CM

## 2024-09-04 PROCEDURE — 3075F SYST BP GE 130 - 139MM HG: CPT | Performed by: INTERNAL MEDICINE

## 2024-09-04 PROCEDURE — 99213 OFFICE O/P EST LOW 20 MIN: CPT | Performed by: INTERNAL MEDICINE

## 2024-09-04 PROCEDURE — 3078F DIAST BP <80 MM HG: CPT | Performed by: INTERNAL MEDICINE

## 2024-09-04 NOTE — PROGRESS NOTES
Subjective   Patient ID: Shiva Alexander is a 63 y.o. male who presents for No chief complaint on file..    HPI doing well overall no chest pain no shortness of breath no side effect with medication bones muscles joints okay bowels normal no dysuria    Past medical history hyperlipidemia    Medication atorvastatin    Allergies noted and unchanged see multiple    Social history no tobacco    Family history noted and unchanged    Prevention has had colonoscopy in the recent past some exercise some prior blood work reviewed    Review of Systems    Objective   There were no vitals taken for this visit.    Physical Exam vital signs noted alert and oriented x 3 NCAT no JVD or bruit chest clear to auscultation and percussion CV regular rate and rhythm S1-S2 without murmur gallop or rub extremities no clubbing cyanosis or edema normal distal pulses    Assessment/Plan    impression hypertension or elevated blood pressure hyperlipidemia other diagnoses  Plan prefers to pass on blood work at this point in time previous blood work was reviewed good diet regular exercise increase water consumption continue with atorvastatin holding on blood pressure medication and recheck 6 for regular examination blood work and screening

## 2024-10-03 ENCOUNTER — LAB (OUTPATIENT)
Dept: LAB | Facility: LAB | Age: 64
End: 2024-10-03
Payer: MEDICARE

## 2024-10-03 DIAGNOSIS — C61 PROSTATE CANCER (MULTI): ICD-10-CM

## 2024-10-03 LAB — PSA SERPL-MCNC: 0.19 NG/ML

## 2024-10-03 PROCEDURE — 36415 COLL VENOUS BLD VENIPUNCTURE: CPT

## 2024-10-03 PROCEDURE — 84153 ASSAY OF PSA TOTAL: CPT

## 2024-10-10 ENCOUNTER — OFFICE VISIT (OUTPATIENT)
Dept: UROLOGY | Facility: HOSPITAL | Age: 64
End: 2024-10-10
Payer: MEDICARE

## 2024-10-10 DIAGNOSIS — R31.9 HEMATURIA, UNSPECIFIED TYPE: Primary | ICD-10-CM

## 2024-10-10 DIAGNOSIS — C61 PROSTATE CA (MULTI): ICD-10-CM

## 2024-10-10 PROCEDURE — 1036F TOBACCO NON-USER: CPT | Performed by: STUDENT IN AN ORGANIZED HEALTH CARE EDUCATION/TRAINING PROGRAM

## 2024-10-10 PROCEDURE — 99214 OFFICE O/P EST MOD 30 MIN: CPT | Performed by: STUDENT IN AN ORGANIZED HEALTH CARE EDUCATION/TRAINING PROGRAM

## 2024-10-10 PROCEDURE — 1159F MED LIST DOCD IN RCRD: CPT | Performed by: STUDENT IN AN ORGANIZED HEALTH CARE EDUCATION/TRAINING PROGRAM

## 2024-10-10 PROCEDURE — 1126F AMNT PAIN NOTED NONE PRSNT: CPT | Performed by: STUDENT IN AN ORGANIZED HEALTH CARE EDUCATION/TRAINING PROGRAM

## 2024-10-10 ASSESSMENT — PAIN SCALES - GENERAL: PAINLEVEL: 0-NO PAIN

## 2024-10-10 NOTE — PROGRESS NOTES
"Subjective   Patient ID: Shiva Alexander \"Shiva Alexander\" is a 64 y.o. male    HPI  64 y.o. male who presents for follow up with a history of prostate cancer, presented for a routine follow-up visit. He reports that his urinary function is normal, with no difficulty in urination. He denies taking any medications such as Flomax (Tamsulosin) and does not recall being prescribed any. He remembers undergoing a cystoscopic examination six months ago. His PSA levels, which had previously shown a slight increase, have now decreased to 0.28, which is his baseline. He reports no issues with his ears, no presence of blood in the urine, no burning, or pain during urination. Overall, he feels well and has no new complaints.    The most recent PSA, conducted on 10/3/2024, revealed 0.19 ng/ml     Review of Systems    All systems were reviewed. Anything negative was noted in the HPI.    Objective   Physical Exam    General: Well developed, well nourished, alert and cooperative, appears in no acute distress   Eyes: Non-injected conjunctiva, sclera clear, no proptosis   Cardiac: Extremities are warm and well perfused. No edema, cyanosis or pallor   Lungs: Breathing is easy, non-labored. Speaking in clear and complete sentences. Normal diaphragmatic movement   MSK: Ambulatory with steady gait, unassisted   Neuro: Alert and oriented to person, place, and time   Psych: Demonstrates good judgment and reason, without hallucinations, abnormal affect or abnormal behaviors   Skin: No obvious lesions, no rashes       No CVA tenderness bilaterally   No suprapubic pain or discomfort       Past Medical History:   Diagnosis Date    Abnormal weight loss 10/02/2018    Unintentional weight loss    Impacted cerumen, right ear 05/30/2019    Impacted cerumen of right ear    Male erectile dysfunction, unspecified 08/31/2021    Male erectile disorder of organic origin    Personal history of other diseases of the digestive system 08/08/2017    History of " anal fissures    Personal history of other diseases of the nervous system and sense organs     History of cerebral palsy    Unspecified injury of ear, initial encounter 05/30/2019    Trauma of ear canal         Past Surgical History:   Procedure Laterality Date    HERNIA REPAIR  09/02/2014    Hernia Repair           Assessment/Plan   History of prostate cancer with stable PSA levels sp EBRT, hx of gross hematuria     64 y.o. male who presents for the above condition, We had a very long and extensive discussion with the patient regarding the pathophysiology, differential diagnosis, risk factor, management, natural history, incidence and diagnostic work-up of the condition.     We had a very long and extensive discussion regarding microscopic hematuria. I explained to the patient the pathophysiology, differential diagnosis, risk factor, associated conditions, and management. We discussed the need to do a microhematuria work-up to rule out any underlying  malignancies in the form of masses, tumor, polyps that might be causing the microscopic hematuria. We discussed at length the risk, benefit, potential complication, adverse events of cystoscopy, ultrasound kidneys, and urine cytology. Patient verbalized understanding and would like to proceed.     PSA level decreased to 0.1  Patient educated about prostate health benefits  Declined cystoscopy procedure against medical advice    Plan:  - Follow up in 1 year with PSA  - Renal US in 1 year      Scribe Attestation  By signing my name below, I, Olinda Titus   attest that this documentation has been prepared under the direction and in the presence of Dr. Ankit Arana

## 2025-05-13 DIAGNOSIS — E78.5 HYPERLIPIDEMIA, UNSPECIFIED HYPERLIPIDEMIA TYPE: ICD-10-CM

## 2025-05-17 RX ORDER — ATORVASTATIN CALCIUM 40 MG/1
40 TABLET, FILM COATED ORAL DAILY
Qty: 90 TABLET | Refills: 1 | Status: SHIPPED | OUTPATIENT
Start: 2025-05-17

## 2025-06-25 ENCOUNTER — APPOINTMENT (OUTPATIENT)
Dept: PRIMARY CARE | Facility: CLINIC | Age: 65
End: 2025-06-25
Payer: MEDICARE

## 2025-06-25 VITALS — BODY MASS INDEX: 26.46 KG/M2 | DIASTOLIC BLOOD PRESSURE: 84 MMHG | SYSTOLIC BLOOD PRESSURE: 144 MMHG | WEIGHT: 174 LBS

## 2025-06-25 DIAGNOSIS — Z00.00 HEALTH CARE MAINTENANCE: Primary | ICD-10-CM

## 2025-06-25 DIAGNOSIS — I10 PRIMARY HYPERTENSION: ICD-10-CM

## 2025-06-25 DIAGNOSIS — D64.9 ANEMIA, UNSPECIFIED TYPE: ICD-10-CM

## 2025-06-25 DIAGNOSIS — E78.49 OTHER HYPERLIPIDEMIA: ICD-10-CM

## 2025-06-25 PROCEDURE — 1160F RVW MEDS BY RX/DR IN RCRD: CPT | Performed by: INTERNAL MEDICINE

## 2025-06-25 PROCEDURE — 1036F TOBACCO NON-USER: CPT | Performed by: INTERNAL MEDICINE

## 2025-06-25 PROCEDURE — 99213 OFFICE O/P EST LOW 20 MIN: CPT | Performed by: INTERNAL MEDICINE

## 2025-06-25 PROCEDURE — 3077F SYST BP >= 140 MM HG: CPT | Performed by: INTERNAL MEDICINE

## 2025-06-25 PROCEDURE — 1159F MED LIST DOCD IN RCRD: CPT | Performed by: INTERNAL MEDICINE

## 2025-06-25 PROCEDURE — 3079F DIAST BP 80-89 MM HG: CPT | Performed by: INTERNAL MEDICINE

## 2025-06-25 PROCEDURE — 1170F FXNL STATUS ASSESSED: CPT | Performed by: INTERNAL MEDICINE

## 2025-06-25 PROCEDURE — G0439 PPPS, SUBSEQ VISIT: HCPCS | Performed by: INTERNAL MEDICINE

## 2025-06-25 PROCEDURE — 93000 ELECTROCARDIOGRAM COMPLETE: CPT | Performed by: INTERNAL MEDICINE

## 2025-06-25 NOTE — PROGRESS NOTES
Subjective   Patient ID: Shiva Alexander is a 64 y.o. male who presents for No chief complaint on file..    HPI CPE see updated front sheet no chest pain no shortness of breath no side effect with medication has been trying to exercise some bones muscles joints okay bowels normal no dysuria    Past medical history hyperlipidemia hypertension?  Anemia    Medication atorvastatin    Allergies noted and unchanged see multiple    Social history no tobacco    Family history noted and unchanged    Prevention has had colonoscopy some exercise some prior blood work reviewed follow-up with urologist has had PSA in the recent past follow-up with psychiatrist    Depression screen not depressed but treated    Review of Systems    Objective   There were no vitals taken for this visit.    Physical Exam vital signs noted alert and oriented x 3 NCAT PERRLA EOMI nares without discharge OP benign TM normal bilateral EAC clear bilateral no AC nodes no lid lag no thyromegaly no JVD or bruit chest clear to auscultation and percussion CV regular rate and rhythm S1-S2 without murmur gallop or rub abdomen soft nontender normal active bowel sounds LS spine normal curvature negative straight leg raise extremities no clubbing cyanosis or edema normal distal pulses DTR 2+    Assessment/Plan    impression General Medical examination hypertension or elevated blood pressure hyperlipidemia other diagnoses anemia?  Plan check EKG advised on heart check comprehensive panel advised on glucose potassium and kidney function as well as liver function check lipid panel advised on cholesterol cholesterol medicine advised on the statin good diet regular exercise good water consumption follow-up for next colonoscopy check CBC advised on blood count previous PSA noted follow-up with urology as or if needed recheck 3 months based on above TT 60 cc 31    Comments on the blood pressures sl elevated (check)  RAC

## 2025-06-26 LAB
ALBUMIN SERPL-MCNC: 4.2 G/DL (ref 3.6–5.1)
ALP SERPL-CCNC: 60 U/L (ref 35–144)
ALT SERPL-CCNC: 25 U/L (ref 9–46)
ANION GAP SERPL CALCULATED.4IONS-SCNC: 8 MMOL/L (CALC) (ref 7–17)
AST SERPL-CCNC: 23 U/L (ref 10–35)
BILIRUB SERPL-MCNC: 1 MG/DL (ref 0.2–1.2)
BUN SERPL-MCNC: 15 MG/DL (ref 7–25)
CALCIUM SERPL-MCNC: 9.2 MG/DL (ref 8.6–10.3)
CHLORIDE SERPL-SCNC: 102 MMOL/L (ref 98–110)
CHOLEST SERPL-MCNC: 120 MG/DL
CHOLEST/HDLC SERPL: 1.8 (CALC)
CO2 SERPL-SCNC: 27 MMOL/L (ref 20–32)
CREAT SERPL-MCNC: 1.17 MG/DL (ref 0.7–1.35)
EGFRCR SERPLBLD CKD-EPI 2021: 69 ML/MIN/1.73M2
ERYTHROCYTE [DISTWIDTH] IN BLOOD BY AUTOMATED COUNT: 13.9 % (ref 11–15)
GLUCOSE SERPL-MCNC: 180 MG/DL (ref 65–139)
HCT VFR BLD AUTO: 43 % (ref 38.5–50)
HDLC SERPL-MCNC: 66 MG/DL
HGB BLD-MCNC: 13.4 G/DL (ref 13.2–17.1)
LDLC SERPL CALC-MCNC: 41 MG/DL (CALC)
MCH RBC QN AUTO: 30.2 PG (ref 27–33)
MCHC RBC AUTO-ENTMCNC: 31.2 G/DL (ref 32–36)
MCV RBC AUTO: 96.8 FL (ref 80–100)
NONHDLC SERPL-MCNC: 54 MG/DL (CALC)
PLATELET # BLD AUTO: 188 THOUSAND/UL (ref 140–400)
PMV BLD REES-ECKER: 9.9 FL (ref 7.5–12.5)
POTASSIUM SERPL-SCNC: 4.1 MMOL/L (ref 3.5–5.3)
PROT SERPL-MCNC: 7.7 G/DL (ref 6.1–8.1)
RBC # BLD AUTO: 4.44 MILLION/UL (ref 4.2–5.8)
SODIUM SERPL-SCNC: 137 MMOL/L (ref 135–146)
TRIGL SERPL-MCNC: 44 MG/DL
WBC # BLD AUTO: 5.3 THOUSAND/UL (ref 3.8–10.8)

## 2025-07-13 ASSESSMENT — PATIENT HEALTH QUESTIONNAIRE - PHQ9
SUM OF ALL RESPONSES TO PHQ9 QUESTIONS 1 AND 2: 0
2. FEELING DOWN, DEPRESSED OR HOPELESS: NOT AT ALL
1. LITTLE INTEREST OR PLEASURE IN DOING THINGS: NOT AT ALL

## 2025-07-13 ASSESSMENT — ACTIVITIES OF DAILY LIVING (ADL)
TAKING_MEDICATION: INDEPENDENT
DRESSING: INDEPENDENT
GROCERY_SHOPPING: INDEPENDENT
MANAGING_FINANCES: INDEPENDENT
DOING_HOUSEWORK: INDEPENDENT
BATHING: INDEPENDENT

## 2025-07-13 ASSESSMENT — ENCOUNTER SYMPTOMS
LOSS OF SENSATION IN FEET: 0
OCCASIONAL FEELINGS OF UNSTEADINESS: 0
DEPRESSION: 0